# Patient Record
Sex: MALE | Race: BLACK OR AFRICAN AMERICAN | NOT HISPANIC OR LATINO | Employment: FULL TIME | URBAN - METROPOLITAN AREA
[De-identification: names, ages, dates, MRNs, and addresses within clinical notes are randomized per-mention and may not be internally consistent; named-entity substitution may affect disease eponyms.]

---

## 2018-03-01 ENCOUNTER — HOSPITAL ENCOUNTER (EMERGENCY)
Facility: HOSPITAL | Age: 33
Discharge: HOME | End: 2018-03-01
Attending: EMERGENCY MEDICINE

## 2018-03-01 VITALS
TEMPERATURE: 98 F | RESPIRATION RATE: 15 BRPM | WEIGHT: 216 LBS | OXYGEN SATURATION: 98 % | DIASTOLIC BLOOD PRESSURE: 70 MMHG | SYSTOLIC BLOOD PRESSURE: 132 MMHG | HEIGHT: 74 IN | HEART RATE: 80 BPM | BODY MASS INDEX: 27.72 KG/M2

## 2018-03-01 DIAGNOSIS — S39.012A LUMBAR STRAIN, INITIAL ENCOUNTER: Primary | ICD-10-CM

## 2018-03-01 PROCEDURE — 99283 EMERGENCY DEPT VISIT LOW MDM: CPT

## 2018-03-01 RX ORDER — METHOCARBAMOL 500 MG/1
500-1000 TABLET, FILM COATED ORAL 3 TIMES DAILY PRN
Qty: 20 TABLET | Refills: 0 | OUTPATIENT
Start: 2018-03-01 | End: 2019-11-18

## 2018-03-01 RX ORDER — IBUPROFEN 400 MG/1
400 TABLET ORAL EVERY 6 HOURS PRN
Qty: 20 TABLET | Refills: 0 | OUTPATIENT
Start: 2018-03-01 | End: 2019-11-18

## 2018-03-01 NOTE — ED PROVIDER NOTES
Encounter Date: 3/1/2018       History     Chief Complaint   Patient presents with    Back Pain     Left back pain that comeas around to the front and down both sides my leg it started two weeks ago but got worse this morning. Denies urinary complaints     This patient is 32-year-old male.  Presents to the emergency department complaining of low back pain for the last 2 weeks.  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  Has not fallen or injured his back.  It is in the left lateral side, radiates into both legs, no associated weakness paresthesias bowel or bladder incontinence or erectile dysfunction.  Denies a prior history of back pain.  No history of arthritis or other problems.  Has not taken any medications for this problem.  Has not yet seen his primary care physician.          Review of patient's allergies indicates:  No Known Allergies  History reviewed. No pertinent past medical history.  History reviewed. No pertinent surgical history.  History reviewed. No pertinent family history.  Social History   Substance Use Topics    Smoking status: Current Every Day Smoker     Packs/day: 0.50     Types: Cigarettes    Smokeless tobacco: Never Used    Alcohol use No     Review of Systems   Constitutional: Negative for chills and fever.   Genitourinary: Negative for difficulty urinating.   Musculoskeletal: Positive for back pain. Negative for arthralgias and myalgias.   Skin: Negative for rash.   Neurological: Negative for weakness and numbness.       Physical Exam     Initial Vitals [03/01/18 0917]   BP Pulse Resp Temp SpO2   136/76 84 15 98.1 °F (36.7 °C) 98 %      MAP       96         Physical Exam    Nursing note and vitals reviewed.  Constitutional: He appears well-developed and well-nourished.   Musculoskeletal: He exhibits tenderness (mild tenderness laterally in the lumbar region on t). He exhibits no edema.   Range of motion is slightly limited.  Flexion about 80°,  extension 30°, right and left rotation 60°, right and left lateral flexion 30°.  Able to walk on his tiptoes and heels without falling to flat-footed stance.  Deep tendon reflexes are 2+ and symmetric at the knees and ankles.  Sensation is intact throughout.  Strength is normal, 5 out of 5 throughout.    Severe bilateral pes planus   Neurological: He has normal strength. No sensory deficit.         ED Course   Procedures  Labs Reviewed - No data to display          Medical Decision Making:   Initial Assessment:   Low back pain.  He said the pain radiates into his legs, no other associated neurologic symptoms.  On physical exam he has mild tenderness and slight limitation to flexion, otherwise exam is normal.  He does have flat feet.  Will refer him to his primary care physician, and to a podiatrist.  Ibuprofen and Robaxin prescribed.  Sedative precautions given.                      Clinical Impression:   The encounter diagnosis was Lumbar strain, initial encounter.    Disposition:   Disposition: Discharged  Condition: Stable                        Anthony Monte MD  03/01/18 [id number]

## 2018-03-01 NOTE — DISCHARGE INSTRUCTIONS
Do not drive or operate machinery when using pain medication, muscle relaxer, or other sedating medication due to sedative effects.  These medications impair your ability to drive safely.

## 2018-03-01 NOTE — ED NOTES
Pt stated his left leg has been hurting for 3 weeks. The pain has been shooting down his left leg.

## 2019-11-18 ENCOUNTER — HOSPITAL ENCOUNTER (EMERGENCY)
Facility: HOSPITAL | Age: 34
Discharge: HOME | End: 2019-11-18
Attending: FAMILY MEDICINE
Payer: COMMERCIAL

## 2019-11-18 VITALS
SYSTOLIC BLOOD PRESSURE: 134 MMHG | DIASTOLIC BLOOD PRESSURE: 78 MMHG | BODY MASS INDEX: 34.39 KG/M2 | TEMPERATURE: 98 F | OXYGEN SATURATION: 98 % | RESPIRATION RATE: 16 BRPM | HEART RATE: 88 BPM | HEIGHT: 74 IN | WEIGHT: 268 LBS

## 2019-11-18 DIAGNOSIS — S76.911A MUSCLE STRAIN OF RIGHT THIGH, INITIAL ENCOUNTER: Primary | ICD-10-CM

## 2019-11-18 PROCEDURE — 63600175 PHARM REV CODE 636 W HCPCS: Mod: ER | Performed by: PHYSICIAN ASSISTANT

## 2019-11-18 PROCEDURE — 96372 THER/PROPH/DIAG INJ SC/IM: CPT | Mod: ER

## 2019-11-18 PROCEDURE — 99284 EMERGENCY DEPT VISIT MOD MDM: CPT | Mod: 25,ER

## 2019-11-18 RX ORDER — IBUPROFEN 800 MG/1
800 TABLET ORAL EVERY 6 HOURS PRN
Qty: 20 TABLET | Refills: 0 | Status: SHIPPED | OUTPATIENT
Start: 2019-11-18

## 2019-11-18 RX ORDER — METHOCARBAMOL 500 MG/1
1000 TABLET, FILM COATED ORAL 3 TIMES DAILY
Qty: 30 TABLET | Refills: 0 | Status: SHIPPED | OUTPATIENT
Start: 2019-11-18 | End: 2019-11-23

## 2019-11-18 RX ORDER — KETOROLAC TROMETHAMINE 30 MG/ML
60 INJECTION, SOLUTION INTRAMUSCULAR; INTRAVENOUS
Status: COMPLETED | OUTPATIENT
Start: 2019-11-18 | End: 2019-11-18

## 2019-11-18 RX ADMIN — KETOROLAC TROMETHAMINE 60 MG: 60 INJECTION, SOLUTION INTRAMUSCULAR at 12:11

## 2019-11-18 NOTE — ED PROVIDER NOTES
Encounter Date: 11/18/2019       History     Chief Complaint   Patient presents with    Leg Pain     Pt states that he has had right thigh pain x2 weeks     34-year-old male presenting complaint of right thigh pain x1 week.  Patient states some pushing and pulling something at work felt that initially benign as bad worsened since that.  Patient denies any leg swelling no pain behind the calf or knee.  Denies any bruising denies any chest pain or shortness of breath.  No history of DVTs or pulmonary embolus.  Patient also denies taking any medications for pain states has not tried any ibuprofen or anti-inflammatories.          Review of patient's allergies indicates:  No Known Allergies  History reviewed. No pertinent past medical history.  History reviewed. No pertinent surgical history.  History reviewed. No pertinent family history.  Social History     Tobacco Use    Smoking status: Current Every Day Smoker     Packs/day: 0.50     Types: Cigarettes    Smokeless tobacco: Never Used   Substance Use Topics    Alcohol use: No    Drug use: No     Review of Systems   Cardiovascular: Negative for palpitations and leg swelling.   Musculoskeletal: Positive for myalgias.   All other systems reviewed and are negative.      Physical Exam     Initial Vitals [11/18/19 1223]   BP Pulse Resp Temp SpO2   134/78 88 16 98.3 °F (36.8 °C) 98 %      MAP       --         Physical Exam    Nursing note and vitals reviewed.  Constitutional: He appears well-nourished.   HENT:   Head: Normocephalic.   Right Ear: External ear normal.   Eyes: EOM are normal. Pupils are equal, round, and reactive to light.   Neck: Normal range of motion. Neck supple.   Cardiovascular: Regular rhythm.   Pulmonary/Chest: Breath sounds normal.   Abdominal: Soft. Bowel sounds are normal.   Musculoskeletal: Normal range of motion.   Right posterior thigh pain induced with stretching leg, no bruising no swelling no bony tenderness    Neurological: He is alert  and oriented to person, place, and time.   Skin: Skin is warm and dry. Capillary refill takes less than 2 seconds.   NVID pulse 2+ b/l pedal    Psychiatric: He has a normal mood and affect. Thought content normal.         ED Course   Procedures  Labs Reviewed - No data to display       Imaging Results    None          Medical Decision Making:   Advised to follow up with his workman's Comp work related injury, patient states Toradol and helped with pain improving.                                   Clinical Impression:       ICD-10-CM ICD-9-CM   1. Muscle strain of right thigh, initial encounter S76.911A 843.9         Disposition:   Disposition: Discharged  Condition: Stable                     Isabel Bennett PA-C  11/18/19 3254